# Patient Record
Sex: FEMALE | Race: WHITE
[De-identification: names, ages, dates, MRNs, and addresses within clinical notes are randomized per-mention and may not be internally consistent; named-entity substitution may affect disease eponyms.]

---

## 2017-07-07 ENCOUNTER — HOSPITAL ENCOUNTER (EMERGENCY)
Dept: HOSPITAL 25 - UCCORT | Age: 52
Discharge: HOME | End: 2017-07-07
Payer: COMMERCIAL

## 2017-07-07 VITALS — DIASTOLIC BLOOD PRESSURE: 69 MMHG | SYSTOLIC BLOOD PRESSURE: 102 MMHG

## 2017-07-07 DIAGNOSIS — L73.9: Primary | ICD-10-CM

## 2017-07-07 PROCEDURE — G0463 HOSPITAL OUTPT CLINIC VISIT: HCPCS

## 2017-07-07 PROCEDURE — 99212 OFFICE O/P EST SF 10 MIN: CPT

## 2017-07-07 NOTE — UC
Skin Complaint HPI





- HPI Summary


HPI Summary: 





Pt presents with c/o "red spots" on face that began after using tanning wright.  

Pt reports that "red dots" are slightly tender then will eventually erupt into 

"pitted scabs".  





- History of Current Complaint


Stated Complaint: SKIN COMPLAINT


Hx Obtained From: Patient


Hx Last Menstrual Period: N/A


Pregnant?: No


Onset/Duration: Gradual Onset, Lasting Days


Skin Exposure Onset/Duration: Days Ago


Timing: Constant


Onset Severity: Mild


Current Severity: Mild


Location: Face


Character: Redness, Raised


Aggravating: Touch


Alleviating: Nothing


Associated Signs & Symptoms: Positive: Tenderness


Related History: Possible Reaction to: Environmental Exposure





- Allergy/Home Medications


Allergies/Adverse Reactions: 


 Allergies











Allergy/AdvReac Type Severity Reaction Status Date / Time


 


Amoxicillin [From Augmentin] AdvReac  GI Verified 07/07/17 19:14


 


Clavulanic Acid AdvReac  GI Verified 07/07/17 19:14





[From Augmentin]     














Review of Systems


Constitutional: Negative


Skin: Rash


Eyes: Negative


ENT: Negative


Respiratory: Negative


Cardiovascular: Negative


Gastrointestinal: Negative


Genitourinary: Negative


Motor: Negative


Neurovascular: Negative


Musculoskeletal: Negative


Neurological: Negative


Psychological: Negative


All Other Systems Reviewed And Are Negative: Yes





PMH/Surg Hx/FS Hx/Imm Hx


Previously Healthy: Yes





- Surgical History


Surgical History: Yes


Surgery Procedure, Year, and Place: hysterectomy.  tonsillectomy.  deviated 

septum.  ectopic pregnancy 1983--contracted Hep B from blood transfusion.  

hernia repair





- Family History


Known Family History: Positive: Cardiac Disease





- Social History


Alcohol Use: None


Substance Use Type: None


Smoking Status (MU): Never Smoked Tobacco





Physical Exam


Triage Information Reviewed: Yes


Appearance: Well-Appearing


Vital Signs: 


 Initial Vital Signs











Temp  98.6 F   07/07/17 19:15


 


Pulse  64   07/07/17 19:15


 


Resp  16   07/07/17 19:15


 


BP  102/69   07/07/17 19:15


 


Pulse Ox  100   07/07/17 19:15











Vital Signs Reviewed: Yes


Eye Exam: Normal


Neck exam: Normal


Respiratory Exam: Normal


Respiratory: Positive: No respiratory distress


Musculoskeletal Exam: Normal


Neurological Exam: Normal


Psychological Exam: Normal


Skin Exam: Other - multiple,scattered, erythematous, raised "bumps, in various 

stages of healing.  central depression noted in multiple scabs.





Course/Dx





- Differential Diagnoses - Skin Complaint


Differential Diagnoses: Contact Dermatitis, MRSA - folliculitis





- Diagnoses


Provider Diagnoses: folliculitis.  MRSA?





Discharge





- Discharge Plan


Condition: Stable


Disposition: HOME


Prescriptions: 


Sulfamethox/Trimethoprim DS* [Bactrim /160 TAB*] 1 tab PO Q12H #14 tab


Patient Education Materials:  Folliculitis (ED)


Referrals: 


Trini De La Cruz MD [Primary Care Provider] - If Needed

## 2018-01-24 ENCOUNTER — HOSPITAL ENCOUNTER (EMERGENCY)
Dept: HOSPITAL 25 - UCCORT | Age: 53
Discharge: HOME | End: 2018-01-24
Payer: MEDICAID

## 2018-01-24 VITALS — DIASTOLIC BLOOD PRESSURE: 69 MMHG | SYSTOLIC BLOOD PRESSURE: 120 MMHG

## 2018-01-24 DIAGNOSIS — R06.02: ICD-10-CM
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
## 2018-01-24 DIAGNOSIS — S20.211A: Primary | ICD-10-CM
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
## 2018-01-24 DIAGNOSIS — Z90.710: ICD-10-CM

## 2018-01-24 DIAGNOSIS — Z90.89: ICD-10-CM
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
## 2018-01-24 DIAGNOSIS — Z88.1: ICD-10-CM

## 2018-01-24 PROCEDURE — 99211 OFF/OP EST MAY X REQ PHY/QHP: CPT

## 2018-01-24 PROCEDURE — G0463 HOSPITAL OUTPT CLINIC VISIT: HCPCS

## 2018-01-24 PROCEDURE — 71046 X-RAY EXAM CHEST 2 VIEWS: CPT

## 2018-01-24 NOTE — RAD
Indication: Chest pain.



3 views of the right ribs demonstrates no fracture. No other bone or joint abnormality is

noted.



IMPRESSION: No fracture of the right ribs is noted.

## 2018-01-24 NOTE — RAD
Indication: Chest pain.



2 views of the chest including dual energy PA views demonstrates no mediastinal shift.

Heart is of normal size and configuration. Lungs are clear.



IMPRESSION: No active cardiopulmonary disease is noted.

## 2018-01-24 NOTE — ED
Adult Trauma





- HPI Summary


HPI Summary: 





53 yr old female with the complaint of right sided chest wall pain.  The 

patient fell four days ago and complains of pain to the right ribs,worse with 

movement, jarring her and deep breath.  She states she fell against a railing 

with her crock pot.  No other complaints. 





- History of Current Complaint


Chief Complaint: UCTrauma


Stated Complaint: RIB PAIN  PT FELL 3 DAYS AGO


Time Seen by Provider: 01/24/18 21:12


Hx Last Menstrual Period: N/A


Pain Intensity: 6





- Allergy/Home Medications


Allergies/Adverse Reactions: 


 Allergies











Allergy/AdvReac Type Severity Reaction Status Date / Time


 


Amoxicillin [From Augmentin] AdvReac  GI Verified 01/24/18 21:11


 


Clavulanic Acid AdvReac  GI Verified 01/24/18 21:11





[From Augmentin]     











Home Medications: 


 Home Medications





Acetaminophen [Acetaminophen Extra Stren] 1,000 mg PO Q6H PRN 01/24/18 [History 

Confirmed 01/24/18]











PMH/Surg Hx/FS Hx/Imm Hx


Previously Healthy: Yes





- Surgical History


Surgery Procedure, Year, and Place: hysterectomy.  tonsillectomy.  deviated 

septum.  ectopic pregnancy 1983--contracted Hep B from blood transfusion.  

hernia repair


Infectious Disease History: No


Infectious Disease History: Reports: Hx Hepatitis - Hepatits B--blood 

transfusion


   Denies: Hx Clostridium Difficile, Hx Human Immunodeficiency Virus (HIV), Hx 

of Known/Suspected MRSA, Hx Shingles, Hx Tuberculosis, Hx Known/Suspected VRE, 

Hx Known/Suspected VRSA, History Other Infectious Disease, Traveled Outside the 

[place] in Last 30 Days





- Family History


Known Family History: Positive: None, Cardiac Disease





- Social History


Alcohol Use: None


Substance Use Type: Reports: None


Smoking Status (MU): Never Smoked Tobacco





Review of Systems


Constitutional: Negative


Positive: Shortness Of Breath


Positive: Other - right chest wall pain after falling


All Other Systems Reviewed And Are Negative: Yes





Physical Exam


Triage Information Reviewed: Yes


Vital Signs On Initial Exam: 


 Initial Vitals











Temp Pulse Resp BP Pulse Ox


 


 98.5 F   77   16   120/69   100 


 


 01/24/18 21:14  01/24/18 21:14  01/24/18 21:14  01/24/18 21:14  01/24/18 21:14











Vital Signs Reviewed: Yes


Appearance: Positive: Well-Appearing, No Pain Distress


Skin: Positive: Warm, Skin Color Reflects Adequate Perfusion


Head/Face: Positive: Normal Head/Face Inspection


Eyes: Positive: EOMI


Neck: Positive: Supple, Nontender


Respiratory/Lung Sounds: Positive: Clear to Auscultation, Breath Sounds Present

, Other - tender over the right chest wall.


Cardiovascular: Positive: RRR.  Negative: Murmur


Abdomen Description: Positive: Nontender


Bowel Sounds: Positive: Present


Musculoskeletal: Positive: Normal, Strength/ROM Intact


Neurological: Positive: Sensory/Motor Intact, Alert, Oriented to Person Place, 

Time, CN Intact II-III


Psychiatric: Positive: Normal





- [place] Coma Scale


Best Eye Response: 4 - Spontaneous


Best Motor Response: 6 - Obeys Commands


Best Verbal Response: 5 - Oriented


Coma Scale Total: 15





Diagnostics





- Vital Signs


 Vital Signs











  Temp Pulse Resp BP Pulse Ox


 


 01/24/18 21:14  98.5 F  77  16  120/69  100














- Laboratory


Lab Statement: Any lab studies that have been ordered have been reviewed, and 

results considered in the medical decision making process.





- Radiology


  ** chest/right ribs


Xray Interpretation: No Acute Changes - Final report reviewed


Radiology Interpretation Completed By: Radiologist





Adult Trauma Course/Dx





- Course


Course Of Treatment: 53 yr old with chest wall injury. Plan DC home in stable 

condition. incentive spirometer.





- Diagnoses


Provider Diagnoses: 


 Chest wall contusion








Discharge





- Discharge Plan


Condition: Good


Disposition: HOME


Patient Education Materials:  Contusion in Adults (ED), Chest Wall Pain (ED)


Referrals: 


Abdulaziz Latham MD [Primary Care Provider] - 2 Days

## 2018-08-04 ENCOUNTER — HOSPITAL ENCOUNTER (EMERGENCY)
Dept: HOSPITAL 25 - UCCORT | Age: 53
Discharge: HOME | End: 2018-08-04
Payer: COMMERCIAL

## 2018-08-04 VITALS — SYSTOLIC BLOOD PRESSURE: 104 MMHG | DIASTOLIC BLOOD PRESSURE: 72 MMHG

## 2018-08-04 DIAGNOSIS — Z88.0: ICD-10-CM

## 2018-08-04 DIAGNOSIS — N39.0: Primary | ICD-10-CM

## 2018-08-04 DIAGNOSIS — G89.29: ICD-10-CM

## 2018-08-04 DIAGNOSIS — R10.13: ICD-10-CM

## 2018-08-04 PROCEDURE — 81003 URINALYSIS AUTO W/O SCOPE: CPT

## 2018-08-04 PROCEDURE — 74176 CT ABD & PELVIS W/O CONTRAST: CPT

## 2018-08-04 PROCEDURE — 99212 OFFICE O/P EST SF 10 MIN: CPT

## 2018-08-04 PROCEDURE — 87086 URINE CULTURE/COLONY COUNT: CPT

## 2018-08-04 PROCEDURE — G0463 HOSPITAL OUTPT CLINIC VISIT: HCPCS

## 2018-08-04 NOTE — UC
Complaint Female HPI





- HPI Summary


HPI Summary: 





urinary pain burning urgency and frequency for 1 week. Also has pain in RUQ 

that is not related





- History Of Current Complaint


Chief Complaint: UCGI


Stated Complaint: URINARY


Time Seen by Provider: 08/04/18 12:23


Hx Obtained From: Patient


Hx Last Menstrual Period: N/A


Pregnant?: No


Onset/Duration: Lasting Days - 7 days urinary pain and burning, Other - always 

has the RUQ pain


Timing: Constant


Pain Intensity: 3


Pain Scale Used: 0-10 Numeric


Character: Burning


Aggravating Factor(s): Urination


Associated Signs And Symptoms: Positive: Negative





- Allergies/Home Medications


Allergies/Adverse Reactions: 


 Allergies











Allergy/AdvReac Type Severity Reaction Status Date / Time


 


clavulanic acid AdvReac  Vomiting Verified 08/04/18 11:51











Home Medications: 


 Home Medications





Vitamin THERAPEUTIC TAB* [Theragran TAB*] 1 tab PO EVERY OTHER DAY 08/04/18 [

History Confirmed 08/04/18]











PMH/Surg Hx/FS Hx/Imm Hx


Previously Healthy: Yes





- Surgical History


Surgical History: Yes


Surgery Procedure, Year, and Place: hysterectomy.  tonsillectomy.  deviated 

septum.  ectopic pregnancy 1983--contracted Hep B from blood transfusion.  

hernia repair





- Family History


Known Family History: Positive: None, Cardiac Disease





- Social History


Occupation: Employed Full-time


Lives: With Family


Alcohol Use: None


Substance Use Type: None


Smoking Status (MU): Never Smoked Tobacco





Review of Systems


Constitutional: Negative


Skin: Negative


Eyes: Negative


ENT: Negative


Respiratory: Negative


Cardiovascular: Negative


Gastrointestinal: Abdominal Pain - RUQ (Hep B in the past)


Genitourinary: Negative, Dysuria, Frequency, Urgency


Motor: Negative


Neurovascular: Negative


Musculoskeletal: Negative


Neurological: Negative


Psychological: Negative


Is Patient Immunocompromised?: No


All Other Systems Reviewed And Are Negative: Yes





Physical Exam


Triage Information Reviewed: Yes


Appearance: Well-Appearing, No Pain Distress, Well-Nourished


Vital Signs: 


 Initial Vital Signs











Temp  98.3 F   08/04/18 11:45


 


Pulse  66   08/04/18 11:45


 


Resp  16   08/04/18 11:45


 


BP  104/72   08/04/18 11:45


 


Pulse Ox  100   08/04/18 11:45











Vital Signs Reviewed: Yes


Eye Exam: Normal


Eyes: Positive: Conjunctiva Clear


ENT Exam: Normal


ENT: Positive: Normal ENT inspection, Hearing grossly normal.  Negative: Trismus

, Muffled voice, Hoarse voice


Dental Exam: Normal


Neck exam: Normal


Neck: Positive: Supple, Nontender


Respiratory Exam: Normal


Respiratory: Positive: Chest non-tender, No respiratory distress


Cardiovascular Exam: Normal


Cardiovascular: Positive: RRR, Pulses Normal, Brisk Capillary Refill


Abdominal Exam: Normal


Abdomen Description: Positive: No Organomegaly, Soft, Other: - tender RUQ.  

Negative: CVA Tenderness (R), CVA Tenderness (L), Distended, Guarding, McBurney'

s Point Tenderness, Peritoneal Signs


Bowel Sounds: Positive: Present


Musculoskeletal Exam: Normal


Musculoskeletal: Positive: Strength Intact, ROM Intact, No Edema


Neurological Exam: Normal


Neurological: Positive: Alert, Muscle Tone Normal


Psychological Exam: Normal


Skin Exam: Normal





 Complaint Female Dx





- Course


Course Of Treatment: macrobid, follow chronic ruq pain with Dr. Parks





- Differential Dx/Diagnosis


Provider Diagnoses: UTI, Chronic RUQ pain





Discharge





- Sign-Out/Discharge


Documenting (check all that apply): Patient Departure





- Discharge Plan


Condition: Stable


Disposition: HOME


Prescriptions: 


Nitrofurantoin Monohyd/M-Cryst [Macrobid 100 mg Capsule] 100 mg PO BID #10 cap


Patient Education Materials:  Urinary Tract Infection in Women (ED), Hematuria (

ED), Acute Abdominal Pain (ED)


Referrals: 


Abdulaziz Parks MD [Primary Care Provider] - 1 Week





- Billing Disposition and Condition


Condition: STABLE


Disposition: Home





Attestation Statement


User Type: Provider - I was available for consult. This patient was seen by the 

COLLIN. The patient was not presented to, seen by, or examined by me. -Jenny

## 2018-10-04 ENCOUNTER — HOSPITAL ENCOUNTER (EMERGENCY)
Dept: HOSPITAL 25 - UCCORT | Age: 53
Discharge: HOME | End: 2018-10-04
Payer: COMMERCIAL

## 2018-10-04 VITALS — SYSTOLIC BLOOD PRESSURE: 105 MMHG | DIASTOLIC BLOOD PRESSURE: 70 MMHG

## 2018-10-04 DIAGNOSIS — M79.672: Primary | ICD-10-CM

## 2018-10-04 DIAGNOSIS — Z88.8: ICD-10-CM

## 2018-10-04 PROCEDURE — 99211 OFF/OP EST MAY X REQ PHY/QHP: CPT

## 2018-10-04 PROCEDURE — G0463 HOSPITAL OUTPT CLINIC VISIT: HCPCS

## 2018-10-04 NOTE — UC
Lower Extremity/Ankle HPI





- HPI Summary


HPI Summary: 





Pt c/o left foot pain that began 4 days. ago.  Pt denies injury. Pt stands 

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 Pt reports that as a child she fracture 

left 5th toe and now position of toe is "tucked under 4th toe".  





- History of Current Complaint


Chief Complaint: UCLowerExtremity


Stated Complaint: LEFT FOOT PAIN


Time Seen by Provider: 10/04/18 21:38


Hx Obtained From: Patient


Hx Last Menstrual Period: N/A


Pregnant?: No


Onset/Duration: Gradual Onset, Lasting Days, Still Present


Severity Initially: Mild


Severity Currently: Moderate


Pain Intensity: 6


Aggravating Factor(s): Standing, Ambulation


Alleviating Factor(s): Rest, Elevation


Able to Bear Weight: Yes





- Risk Factors


Gout Risk Factors: Age Over 40


DVT Risk Factors: Negative


Septic Arthritis Risk Factor: Negative





- Allergies/Home Medications


Allergies/Adverse Reactions: 


 Allergies











Allergy/AdvReac Type Severity Reaction Status Date / Time


 


clavulanic acid AdvReac  Vomiting Verified 10/04/18 21:08











Home Medications: 


 Home Medications





Ibuprofen TAB* [Advil TAB*] 400 mg PO Q6H PRN 10/04/18 [History Confirmed 10/04/

18]











PMH/Surg Hx/FS Hx/Imm Hx


Previously Healthy: Yes





- Surgical History


Surgical History: Yes


Surgery Procedure, Year, and Place: hysterectomy.  tonsillectomy.  deviated 

septum.  ectopic pregnancy 1983--contracted Hep B from blood transfusion.  

hernia repair





- Family History


Known Family History: Positive: None, Cardiac Disease





- Social History


Occupation: Employed Full-time


Lives: With Family


Alcohol Use: None


Substance Use Type: None


Smoking Status (MU): Never Smoked Tobacco


Have You Smoked in the Last Year: No





Review of Systems


Constitutional: Negative


Skin: Negative


Eyes: Negative


ENT: Negative


Respiratory: Negative


Cardiovascular: Negative


Gastrointestinal: Negative


Genitourinary: Negative


Motor: Negative


Neurovascular: Negative


Musculoskeletal: Arthralgia - left foot 4th and 5th metatarsal


Neurological: Negative


Psychological: Negative


Is Patient Immunocompromised?: No


All Other Systems Reviewed And Are Negative: Yes





Physical Exam


Triage Information Reviewed: Yes


Appearance: Well-Appearing


Vital Signs: 


 Initial Vital Signs











Temp  97.9 F   10/04/18 21:09


 


Pulse  67   10/04/18 21:09


 


Resp  18   10/04/18 21:09


 


BP  105/70   10/04/18 21:09


 


Pulse Ox  99   10/04/18 21:09











Vital Signs Reviewed: Yes


Eye Exam: Normal


ENT Exam: Normal


ENT: Positive: Hearing grossly normal


Dental Exam: Normal


Neck exam: Normal


Respiratory: Positive: No respiratory distress


Musculoskeletal: Positive: Other: - pain at mid 4th and 5th metatarsal.


Neurological Exam: Normal


Psychological Exam: Normal


Skin Exam: Normal





Lower Extremity Course/Dx





- Differential Dx/Diagnosis


Differential Diagnosis/HQI/PQRI: Fracture (Closed), Tendonitis


Provider Diagnoses: left foot pain





Discharge





- Sign-Out/Discharge


Documenting (check all that apply): Patient Departure


All imaging exams completed and their final reports reviewed: No





- Discharge Plan


Condition: Stable


Disposition: HOME


Patient Education Materials:  Arthralgia (ED)


Referrals: 


Abdulaziz Latham MD [Primary Care Provider] - If Needed


Wes Ramirez MD [Medical Doctor] - If Needed


Ricardo Ulloa DPM [Doctor of Podiatric Medicine] - If Needed





- Billing Disposition and Condition


Condition: STABLE


Disposition: Home

## 2018-10-05 NOTE — RAD
HISTORY: left foot pain X 4 days,



COMPARISONS: None



VIEWS: 3 , Frontal, lateral, and oblique views of the left foot 



FINDINGS:



BONE DENSITY: Normal.

BONES: There is no displaced fracture.

JOINTS: There is osteoarthritis of the first MTP joint. 

ALIGNMENT: There is no dislocation. 

SOFT TISSUES: Unremarkable.



OTHER FINDINGS: None.



IMPRESSION: 

NO ACUTE OSSEOUS INJURY. IF SYMPTOMS PERSIST, RECOMMEND REPEAT IMAGING.



R1

## 2018-10-05 NOTE — UC
- Progress Note


Progress Note: 





Patient Name:         BARB MERINO                                          

                        Medical Record#: [id number]


Ordering Physician: Norma East NP                                  

                        Acct.#: [id number]


:     1965         Age: 53   Sex: F                                   

                        Location: [facility]


Exam Date: 10/04/18 2143                                                       

                        ADM Status: [facility] ER


Order Information:                         FOOT LEFT 3+ VWS


Accession Number:                          [id number]


CPT:                                       02480


HISTORY: left foot pain X 4 days,





COMPARISONS: None





VIEWS: 3 , Frontal, lateral, and oblique views of the left foot 





FINDINGS:





BONE DENSITY: Normal.


BONES: There is no displaced fracture.


JOINTS: There is osteoarthritis of the first MTP joint. 


ALIGNMENT: There is no dislocation. 


SOFT TISSUES: Unremarkable.





OTHER FINDINGS: None.





IMPRESSION: 


NO ACUTE OSSEOUS INJURY. IF SYMPTOMS PERSIST, RECOMMEND REPEAT IMAGING.





R1





____________________________________________________________


<Electronically signed by Abdulaziz Lay MD in OV>  10/05/18 0751


Dictated By: Abdulaziz Lay MD


Dictated Date/Time: 10/05/18 0751


Transcribed Date/Time: 10/05/18 0750


Copy to:











CC:Abdulaziz Latham MD; Norma East NP; Bess King MD


Imaging - [facility]                                 Imaging - [facility] 


[address]                                       [address] [place] [address]


ph ([phone])                                     ph ([phone])        

                                        ph ([phone]) 























This report is only to be considered final once signed by the Provider(s) as 

displayed in the "<Electronically Signed by >" field (s). Absence of a 


signature indicates the report is in a draft status and still needs to be 

finalized. In the event this document was created by someone other than the 


signing Provider, the individual initiating the document will be listed in the 

"Entered by:" or "Dictated by:" fields.


                                                                 1 of 1








Discharge





- Sign-Out/Discharge


Documenting (check all that apply): Post-Discharge Follow Up


All imaging exams completed and their final reports reviewed: Yes





- Discharge Plan


Condition: Stable


Disposition: HOME


Patient Education Materials:  Arthralgia (ED)


Referrals: 


Abdulaziz Latham MD [Primary Care Provider] - If Needed


Wes Ramirez MD [Medical Doctor] - If Needed


Ricardo Ulloa DPM [Doctor of Podiatric Medicine] - If Needed





- Billing Disposition and Condition


Condition: STABLE


Disposition: Home

## 2019-04-28 ENCOUNTER — HOSPITAL ENCOUNTER (EMERGENCY)
Dept: HOSPITAL 25 - UCCORT | Age: 54
Discharge: HOME | End: 2019-04-28
Payer: MEDICAID

## 2019-04-28 VITALS — SYSTOLIC BLOOD PRESSURE: 99 MMHG | DIASTOLIC BLOOD PRESSURE: 65 MMHG

## 2019-04-28 DIAGNOSIS — R53.83: ICD-10-CM

## 2019-04-28 DIAGNOSIS — R20.2: ICD-10-CM

## 2019-04-28 DIAGNOSIS — Z88.1: ICD-10-CM

## 2019-04-28 DIAGNOSIS — M79.10: Primary | ICD-10-CM

## 2019-04-28 DIAGNOSIS — H53.8: ICD-10-CM

## 2019-04-28 DIAGNOSIS — Z87.891: ICD-10-CM

## 2019-04-28 PROCEDURE — 99211 OFF/OP EST MAY X REQ PHY/QHP: CPT

## 2019-04-28 PROCEDURE — 36415 COLL VENOUS BLD VENIPUNCTURE: CPT

## 2019-04-28 PROCEDURE — G0463 HOSPITAL OUTPT CLINIC VISIT: HCPCS

## 2019-04-28 PROCEDURE — 87798 DETECT AGENT NOS DNA AMP: CPT

## 2019-04-28 PROCEDURE — 85025 COMPLETE CBC W/AUTO DIFF WBC: CPT

## 2019-04-28 PROCEDURE — 87476 LYME DIS DNA AMP PROBE: CPT

## 2019-04-28 NOTE — UC
UC General HPI





- HPI Summary


HPI Summary: 





Pt c/o generalized body aches, intermittent cognitive impairment X 1 month.  Pt 

has known hx of tick bites and is requesting testing for lyme disease. Also c/o 

intermittent left eye "blurriness"





- History of Current Complaint


Chief Complaint: UCGeneralIllness


Stated Complaint: MUSCLE ACHES


Time Seen by Provider: 04/28/19 15:21


Hx Obtained From: Patient


Hx Last Menstrual Period: N/A


Onset/Duration: Gradual Onset, Lasting Weeks, Still Present


Timing: Intermittent Episodes Lasting: - body and joint achesof cognitive 

impairment per pt.


Onset Severity: Mild


Current Severity: Mild


Pain Intensity: 3


Associated Signs & Symptoms: Positive: Other - tick bites





- Allergy/Home Medications


Allergies/Adverse Reactions: 


 Allergies











Allergy/AdvReac Type Severity Reaction Status Date / Time


 


clavulanic acid AdvReac  Vomiting Verified 04/28/19 15:18














PMH/Surg Hx/FS Hx/Imm Hx


Previously Healthy: Yes





- Surgical History


Surgical History: Yes


Surgery Procedure, Year, and Place: hysterectomy.  tonsillectomy.  deviated 

septum.  ectopic pregnancy 1983--contracted Hep B from blood transfusion.  

hernia repair





- Family History


Known Family History: Positive: None, Cardiac Disease





- Social History


Occupation: Employed Full-time


Lives: With Family


Alcohol Use: None


Substance Use Type: None


Smoking Status (MU): Former Smoker


Have You Smoked in the Last Year: No





- Immunization History


Vaccination Up to Date: Yes





Review of Systems


All Other Systems Reviewed And Are Negative: Yes


Constitutional: Positive: Fatigue


Skin: Positive: Negative


Eyes: Positive: Blurred Vision - right eye


ENT: Positive: Negative


Respiratory: Positive: Negative


Cardiovascular: Positive: Negative


Gastrointestinal: Positive: Negative


Genitourinary: Positive: Negative


Motor: Positive: Negative


Neurovascular: Positive: Negative


Musculoskeletal: Positive: Arthralgia, Myalgia


Neurological: Positive: Paresthesia - intermittent


Psychological: Positive: Negative


Is Patient Immunocompromised?: No





Physical Exam


Triage Information Reviewed: Yes


Appearance: Well-Appearing


Vital Signs: 


 Initial Vital Signs











Temp  97.2 F   04/28/19 15:20


 


Pulse  66   04/28/19 15:20


 


Resp  18   04/28/19 15:20


 


BP  99/65   04/28/19 15:20


 


Pulse Ox  100   04/28/19 15:20











Vital Signs Reviewed: Yes


Eye Exam: Normal


ENT Exam: Normal


Dental Exam: Normal


Neck exam: Normal


Respiratory Exam: Normal


Cardiovascular Exam: Normal


Musculoskeletal Exam: Normal


Neurological Exam: Normal


Psychological Exam: Normal


Skin Exam: Normal





Course/Dx





- Diagnoses


Provider Diagnosis: 


 Myalgia








Discharge





- Sign-Out/Discharge


Documenting (check all that apply): Patient Departure


All imaging exams completed and their final reports reviewed: No Studies





- Discharge Plan


Condition: Stable


Disposition: HOME


Patient Education Materials:  Musculoskeletal Pain (ED)


Referrals: 


Flaco Hugo MD [Primary Care Provider] - As Soon As Possible





- Billing Disposition and Condition


Condition: STABLE


Disposition: Home

## 2019-04-29 LAB
BASOPHILS # BLD AUTO: 0 10^3/UL (ref 0–0.2)
EOSINOPHIL # BLD AUTO: 0.1 10^3/UL (ref 0–0.6)
HCT VFR BLD AUTO: 40 % (ref 33–41)
HGB BLD-MCNC: 13.2 G/DL (ref 12–16)
LYMPHOCYTES # BLD AUTO: 2.1 10^3/UL (ref 1–4.8)
MCH RBC QN AUTO: 29 PG (ref 27–31)
MCHC RBC AUTO-ENTMCNC: 33 G/DL (ref 31–36)
MCV RBC AUTO: 88 FL (ref 80–97)
MONOCYTES # BLD AUTO: 0.5 10^3/UL (ref 0–0.8)
NEUTROPHILS # BLD AUTO: 2.6 10^3/UL (ref 1.5–7.7)
NRBC # BLD AUTO: 0 10^3/UL
NRBC BLD QL AUTO: 0.8
PLATELET # BLD AUTO: 239 10^3/UL (ref 150–450)
RBC # BLD AUTO: 4.53 10^6 /UL (ref 3.7–4.87)
WBC # BLD AUTO: 5.3 10^3/UL (ref 3.5–10.8)

## 2019-08-12 ENCOUNTER — HOSPITAL ENCOUNTER (OUTPATIENT)
Dept: HOSPITAL 25 - OR | Age: 54
Discharge: HOME | End: 2019-08-12
Attending: ORTHOPAEDIC SURGERY
Payer: MEDICAID

## 2019-08-12 VITALS — SYSTOLIC BLOOD PRESSURE: 95 MMHG | DIASTOLIC BLOOD PRESSURE: 65 MMHG

## 2019-08-12 DIAGNOSIS — R22.31: ICD-10-CM

## 2019-08-12 DIAGNOSIS — M67.441: Primary | ICD-10-CM

## 2019-08-12 PROCEDURE — 88305 TISSUE EXAM BY PATHOLOGIST: CPT

## 2019-08-12 NOTE — OP
DATE OF OPERATION:  08/12/19 - SDS

 

DATE OF BIRTH:  [birth year]

 

SURGEON:  Mckay Moore MD.

 

ASSISTANT:  LEONIDAS Unger.

 

ANESTHESIOLOGIST:  Dr. Alvarado.

 

ANESTHESIA:  Local MAC.

 

PRE-OP DIAGNOSIS:  Right ring finger soft tissue mass.

 

POST-OP DIAGNOSIS:  Right ring finger soft tissue mass.

 

OPERATIVE PROCEDURE:  Excision of right ring finger soft tissue mass.

 

INDICATIONS:  Day has a mass near the MP joint flexion crease.  
Symptomatically, we talked about her treatment options.  She wanted to have it 
excised.

 

ESTIMATED BLOOD LOSS:  1 mL.

 

COMPLICATIONS:  None.

 

FINDINGS:  See above and below.

 

DESCRIPTION OF PROCEDURE:  aDy was seen in the preoperative holding area.  
The correct side, site, and procedure were identified.  We came back to the 
operating room and the arm was prepped and draped in the usual fashion and a 
time-out was performed.

 

The arm was exsanguinated with the Esmarch and the forearm tourniquet was 
inflated to 200 mmHg.  I then made a trapezoidal-shaped incision over the mass 
along the midaxial line, was brought back across the MP joint flexion crease 
obliquely. Dissection was carried down and the digital nerve was visualized.  
The Ragnell retractors were placed and the nerve was protected.  Full-thickness 
flaps were raised off the mass, which was then cut out with the 15-blade very 
carefully to preserve the underlying flexion tendon sheath.  It was handed off 
as a specimen. The sheath was then cauterized with the Bovie, again taking 
great care not to have the Bovie anywhere near the digital nerve.  The wound 
was then irrigated out.  The skin was closed with 4-0 nylon suture.

 

 [id number]/CPS #: [id number]

MTDD

## 2024-05-23 NOTE — RAD
Indication: Right flank pain.



CT of the abdomen and pelvis was performed without oral or IV contrast administration.

Coronal and sagittal reconstructed. Coronal and sagittal reconstructed images were

obtained.



The lung bases demonstrate no pleural fluid, nodules or masses. Heart is of normal size

without pericardial effusion. 3 mm pleural-based density is noted in the left lung base

likely of no clinical significance. No alveolar consolidation is noted. The heart

demonstrates no pericardial effusion.



The liver is normal in size. There are no focal lesions or intrahepatic duct dilatation

noted. This low density lesion in the periphery of the right lobe measuring up to 10 mm

likely representing a cyst. No intrahepatic duct dilatation is noted.



The spleen is normal in size. The pancreas demonstrates no mass or pancreatic duct

dilatation. Common duct is not dilated. The gallbladder demonstrates no calcified

gallstones. No pericholecystic fluid or wall thickening is identified. The common duct is

not dilated.



No adrenal masses are noted. The kidneys demonstrate no hydronephrosis in either kidney.

No evidence of hydroureter is noted. No evidence of ureteral calculi is noted. The urinary

bladder is unremarkable.



The aorta and inferior vena cava are unremarkable. No retroperitoneal lymphadenopathy is

noted. No dilated loops of bowel are noted. No hernias are noted.



The appendix is visualized and appears to be within normal limits.



The bony structures are otherwise unremarkable. Patient appears to be status post

hysterectomy.



IMPRESSION: No definite obstructive uropathy is noted. No hydronephrosis is noted in

either kidney.



Likely hepatic cyst.



Patient is status post hysterectomy.



Normal appendix.
,DirectAddress_[email or web address]